# Patient Record
Sex: MALE | Race: WHITE | Employment: FULL TIME | URBAN - METROPOLITAN AREA
[De-identification: names, ages, dates, MRNs, and addresses within clinical notes are randomized per-mention and may not be internally consistent; named-entity substitution may affect disease eponyms.]

---

## 2019-03-19 PROBLEM — M75.41 IMPINGEMENT SYNDROME OF SHOULDER, RIGHT: Status: ACTIVE | Noted: 2019-03-19

## 2019-03-19 PROBLEM — M77.11 LATERAL EPICONDYLITIS, RIGHT ELBOW: Status: ACTIVE | Noted: 2019-03-19

## 2020-02-10 PROBLEM — M75.101 ROTATOR CUFF SYNDROME OF BOTH SHOULDERS: Status: ACTIVE | Noted: 2020-02-10

## 2020-02-10 PROBLEM — M75.102 ROTATOR CUFF SYNDROME OF BOTH SHOULDERS: Status: ACTIVE | Noted: 2020-02-10

## 2023-02-13 ENCOUNTER — OFFICE VISIT (OUTPATIENT)
Facility: LOCATION | Age: 58
End: 2023-02-13
Payer: COMMERCIAL

## 2023-02-13 DIAGNOSIS — K40.20 BILATERAL INGUINAL HERNIA WITHOUT OBSTRUCTION OR GANGRENE, RECURRENCE NOT SPECIFIED: Primary | ICD-10-CM

## 2023-02-13 NOTE — PATIENT INSTRUCTIONS
This patient presents in consultation from the primary care service regarding bilateral inguinal hernias. He was noted on recent examination to have bilateral hernias by Dr. Bashir carias. He states that he is having increasing pain in the right groin during exercise. He has a lump that can be pushed back in. He is aware that there is a small hernia on the left side. That one is asymptomatic. He states that he has not had any prior abdominal operations. He has no history of prior hernia repair. There is no specific event to which she can ascribe his hernia or symptoms. He has no trouble urinating. He has no constipation. He has no abdominal pain or distention. He has no nausea or vomiting. He has no developmental abnormalities of the genitalia. He has never had prostate cancer, radiation, or prostate surgery. He is not on any blood thinners. He has never had heart attack, stroke, heart murmur, heart valve replacement. The patient states he is being worked up for a cardiac arrhythmia. He states that he gets an extra beat every so often. Pulse exam: This patient has frequent PVCs. Otherwise a regular heart rate. Abdominal exam reveals him to have bilateral inguinal hernias right greater than left. The larger hernia on the right is 4 cm with the sac easily reducible. The one on the left is 2 cm sac also easily reducible. Both testes are present and descended bilaterally. There is no evidence of varicocele. There are no extra cord structures. Penis is normal and circumcised. Liver is normal, spleen is not palpable. There is no evidence of umbilical hernia. This patient will undergo bilateral laparoscopic inguinal herniorrhaphy with mesh. I had a long conversation with the patient regarding the timing of the operation. He should complete his cardiac work-up. Despite this, we should put it on the schedule as we are already scheduling 6 to 8 weeks in advance.     I described the procedure, possible outcomes, possibility of recurrence, and restrictions on activity after surgery. All risks, benefits, complications and alternatives to the proposed procedure(s) were fully discussed with the patient. All questions from the patient were answered in detail. The patient seemed to understand the conversation and its details. Consent for the procedure(s) was confirmed with the patient.

## 2023-03-21 ENCOUNTER — LAB ENCOUNTER (OUTPATIENT)
Dept: LAB | Age: 58
End: 2023-03-21
Payer: COMMERCIAL

## 2023-03-21 DIAGNOSIS — Z01.818 PRE-OP TESTING: ICD-10-CM

## 2023-03-22 LAB — SARS-COV-2 RNA RESP QL NAA+PROBE: NOT DETECTED

## 2023-03-24 ENCOUNTER — ANESTHESIA (OUTPATIENT)
Dept: SURGERY | Facility: HOSPITAL | Age: 58
End: 2023-03-24
Payer: COMMERCIAL

## 2023-03-24 ENCOUNTER — HOSPITAL ENCOUNTER (OUTPATIENT)
Facility: HOSPITAL | Age: 58
Setting detail: HOSPITAL OUTPATIENT SURGERY
Discharge: HOME | End: 2023-03-24
Attending: COLON & RECTAL SURGERY | Admitting: COLON & RECTAL SURGERY
Payer: COMMERCIAL

## 2023-03-24 ENCOUNTER — ANESTHESIA EVENT (OUTPATIENT)
Dept: SURGERY | Facility: HOSPITAL | Age: 58
End: 2023-03-24
Payer: COMMERCIAL

## 2023-03-24 VITALS
DIASTOLIC BLOOD PRESSURE: 72 MMHG | TEMPERATURE: 98 F | HEART RATE: 78 BPM | RESPIRATION RATE: 14 BRPM | BODY MASS INDEX: 24.61 KG/M2 | WEIGHT: 181.69 LBS | SYSTOLIC BLOOD PRESSURE: 126 MMHG | OXYGEN SATURATION: 100 % | HEIGHT: 72 IN

## 2023-03-24 DIAGNOSIS — Z01.818 PRE-OP TESTING: Primary | ICD-10-CM

## 2023-03-24 PROCEDURE — 0YUA4JZ SUPPLEMENT BILATERAL INGUINAL REGION WITH SYNTHETIC SUBSTITUTE, PERCUTANEOUS ENDOSCOPIC APPROACH: ICD-10-PCS | Performed by: COLON & RECTAL SURGERY

## 2023-03-24 DEVICE — BARD MESH
Type: IMPLANTABLE DEVICE | Site: GROIN | Status: FUNCTIONAL
Brand: BARD MESH

## 2023-03-24 RX ORDER — BUPIVACAINE HYDROCHLORIDE AND EPINEPHRINE 5; 5 MG/ML; UG/ML
INJECTION, SOLUTION EPIDURAL; INTRACAUDAL; PERINEURAL AS NEEDED
Status: DISCONTINUED | OUTPATIENT
Start: 2023-03-24 | End: 2023-03-24 | Stop reason: HOSPADM

## 2023-03-24 RX ORDER — NALOXONE HYDROCHLORIDE 0.4 MG/ML
80 INJECTION, SOLUTION INTRAMUSCULAR; INTRAVENOUS; SUBCUTANEOUS AS NEEDED
Status: DISCONTINUED | OUTPATIENT
Start: 2023-03-24 | End: 2023-03-24

## 2023-03-24 RX ORDER — HYDROMORPHONE HYDROCHLORIDE 1 MG/ML
0.4 INJECTION, SOLUTION INTRAMUSCULAR; INTRAVENOUS; SUBCUTANEOUS EVERY 5 MIN PRN
Status: DISCONTINUED | OUTPATIENT
Start: 2023-03-24 | End: 2023-03-24

## 2023-03-24 RX ORDER — SCOLOPAMINE TRANSDERMAL SYSTEM 1 MG/1
1 PATCH, EXTENDED RELEASE TRANSDERMAL ONCE
Status: DISCONTINUED | OUTPATIENT
Start: 2023-03-24 | End: 2023-03-24 | Stop reason: HOSPADM

## 2023-03-24 RX ORDER — PROCHLORPERAZINE EDISYLATE 5 MG/ML
5 INJECTION INTRAMUSCULAR; INTRAVENOUS EVERY 8 HOURS PRN
Status: DISCONTINUED | OUTPATIENT
Start: 2023-03-24 | End: 2023-03-24

## 2023-03-24 RX ORDER — NEOSTIGMINE METHYLSULFATE 1 MG/ML
INJECTION, SOLUTION INTRAVENOUS AS NEEDED
Status: DISCONTINUED | OUTPATIENT
Start: 2023-03-24 | End: 2023-03-24 | Stop reason: SURG

## 2023-03-24 RX ORDER — ONDANSETRON 2 MG/ML
4 INJECTION INTRAMUSCULAR; INTRAVENOUS EVERY 6 HOURS PRN
Status: DISCONTINUED | OUTPATIENT
Start: 2023-03-24 | End: 2023-03-24

## 2023-03-24 RX ORDER — ONDANSETRON 2 MG/ML
INJECTION INTRAMUSCULAR; INTRAVENOUS AS NEEDED
Status: DISCONTINUED | OUTPATIENT
Start: 2023-03-24 | End: 2023-03-24 | Stop reason: SURG

## 2023-03-24 RX ORDER — HYDROCODONE BITARTRATE AND ACETAMINOPHEN 5; 325 MG/1; MG/1
1 TABLET ORAL ONCE AS NEEDED
Status: COMPLETED | OUTPATIENT
Start: 2023-03-24 | End: 2023-03-24

## 2023-03-24 RX ORDER — SODIUM CHLORIDE, SODIUM LACTATE, POTASSIUM CHLORIDE, CALCIUM CHLORIDE 600; 310; 30; 20 MG/100ML; MG/100ML; MG/100ML; MG/100ML
INJECTION, SOLUTION INTRAVENOUS CONTINUOUS
Status: DISCONTINUED | OUTPATIENT
Start: 2023-03-24 | End: 2023-03-24

## 2023-03-24 RX ORDER — HYDROMORPHONE HYDROCHLORIDE 1 MG/ML
0.6 INJECTION, SOLUTION INTRAMUSCULAR; INTRAVENOUS; SUBCUTANEOUS EVERY 5 MIN PRN
Status: DISCONTINUED | OUTPATIENT
Start: 2023-03-24 | End: 2023-03-24

## 2023-03-24 RX ORDER — CEFAZOLIN SODIUM/WATER 2 G/20 ML
2 SYRINGE (ML) INTRAVENOUS ONCE
Status: COMPLETED | OUTPATIENT
Start: 2023-03-24 | End: 2023-03-24

## 2023-03-24 RX ORDER — LIDOCAINE HYDROCHLORIDE 10 MG/ML
INJECTION, SOLUTION EPIDURAL; INFILTRATION; INTRACAUDAL; PERINEURAL AS NEEDED
Status: DISCONTINUED | OUTPATIENT
Start: 2023-03-24 | End: 2023-03-24 | Stop reason: SURG

## 2023-03-24 RX ORDER — HYDROMORPHONE HYDROCHLORIDE 1 MG/ML
0.2 INJECTION, SOLUTION INTRAMUSCULAR; INTRAVENOUS; SUBCUTANEOUS EVERY 5 MIN PRN
Status: DISCONTINUED | OUTPATIENT
Start: 2023-03-24 | End: 2023-03-24

## 2023-03-24 RX ORDER — MIDAZOLAM HYDROCHLORIDE 1 MG/ML
1 INJECTION INTRAMUSCULAR; INTRAVENOUS EVERY 5 MIN PRN
Status: DISCONTINUED | OUTPATIENT
Start: 2023-03-24 | End: 2023-03-24

## 2023-03-24 RX ORDER — ACETAMINOPHEN 500 MG
1000 TABLET ORAL ONCE
Status: DISCONTINUED | OUTPATIENT
Start: 2023-03-24 | End: 2023-03-24 | Stop reason: HOSPADM

## 2023-03-24 RX ORDER — HEPARIN SODIUM 5000 [USP'U]/ML
5000 INJECTION, SOLUTION INTRAVENOUS; SUBCUTANEOUS ONCE
Status: COMPLETED | OUTPATIENT
Start: 2023-03-24 | End: 2023-03-24

## 2023-03-24 RX ORDER — MEPERIDINE HYDROCHLORIDE 25 MG/ML
12.5 INJECTION INTRAMUSCULAR; INTRAVENOUS; SUBCUTANEOUS AS NEEDED
Status: DISCONTINUED | OUTPATIENT
Start: 2023-03-24 | End: 2023-03-24

## 2023-03-24 RX ORDER — KETOROLAC TROMETHAMINE 30 MG/ML
INJECTION, SOLUTION INTRAMUSCULAR; INTRAVENOUS AS NEEDED
Status: DISCONTINUED | OUTPATIENT
Start: 2023-03-24 | End: 2023-03-24 | Stop reason: SURG

## 2023-03-24 RX ORDER — HYDROCODONE BITARTRATE AND ACETAMINOPHEN 5; 325 MG/1; MG/1
1 TABLET ORAL EVERY 6 HOURS PRN
Qty: 12 TABLET | Refills: 0 | Status: SHIPPED | OUTPATIENT
Start: 2023-03-24

## 2023-03-24 RX ORDER — HYDROCODONE BITARTRATE AND ACETAMINOPHEN 5; 325 MG/1; MG/1
2 TABLET ORAL ONCE AS NEEDED
Status: COMPLETED | OUTPATIENT
Start: 2023-03-24 | End: 2023-03-24

## 2023-03-24 RX ORDER — DIPHENHYDRAMINE HYDROCHLORIDE 50 MG/ML
12.5 INJECTION INTRAMUSCULAR; INTRAVENOUS AS NEEDED
Status: DISCONTINUED | OUTPATIENT
Start: 2023-03-24 | End: 2023-03-24

## 2023-03-24 RX ORDER — ACETAMINOPHEN 500 MG
1000 TABLET ORAL ONCE AS NEEDED
Status: COMPLETED | OUTPATIENT
Start: 2023-03-24 | End: 2023-03-24

## 2023-03-24 RX ORDER — ROCURONIUM BROMIDE 10 MG/ML
INJECTION, SOLUTION INTRAVENOUS AS NEEDED
Status: DISCONTINUED | OUTPATIENT
Start: 2023-03-24 | End: 2023-03-24 | Stop reason: SURG

## 2023-03-24 RX ORDER — GLYCOPYRROLATE 0.2 MG/ML
INJECTION, SOLUTION INTRAMUSCULAR; INTRAVENOUS AS NEEDED
Status: DISCONTINUED | OUTPATIENT
Start: 2023-03-24 | End: 2023-03-24 | Stop reason: SURG

## 2023-03-24 RX ORDER — DEXAMETHASONE SODIUM PHOSPHATE 4 MG/ML
VIAL (ML) INJECTION AS NEEDED
Status: DISCONTINUED | OUTPATIENT
Start: 2023-03-24 | End: 2023-03-24 | Stop reason: SURG

## 2023-03-24 RX ADMIN — ONDANSETRON 4 MG: 2 INJECTION INTRAMUSCULAR; INTRAVENOUS at 10:23:00

## 2023-03-24 RX ADMIN — KETOROLAC TROMETHAMINE 30 MG: 30 INJECTION, SOLUTION INTRAMUSCULAR; INTRAVENOUS at 11:25:00

## 2023-03-24 RX ADMIN — SODIUM CHLORIDE, SODIUM LACTATE, POTASSIUM CHLORIDE, CALCIUM CHLORIDE: 600; 310; 30; 20 INJECTION, SOLUTION INTRAVENOUS at 10:12:00

## 2023-03-24 RX ADMIN — GLYCOPYRROLATE 0.4 MG: 0.2 INJECTION, SOLUTION INTRAMUSCULAR; INTRAVENOUS at 11:21:00

## 2023-03-24 RX ADMIN — DEXAMETHASONE SODIUM PHOSPHATE 8 MG: 4 MG/ML VIAL (ML) INJECTION at 10:23:00

## 2023-03-24 RX ADMIN — SODIUM CHLORIDE, SODIUM LACTATE, POTASSIUM CHLORIDE, CALCIUM CHLORIDE: 600; 310; 30; 20 INJECTION, SOLUTION INTRAVENOUS at 11:06:00

## 2023-03-24 RX ADMIN — CEFAZOLIN SODIUM/WATER 2 G: 2 G/20 ML SYRINGE (ML) INTRAVENOUS at 10:23:00

## 2023-03-24 RX ADMIN — ROCURONIUM BROMIDE 40 MG: 10 INJECTION, SOLUTION INTRAVENOUS at 10:17:00

## 2023-03-24 RX ADMIN — LIDOCAINE HYDROCHLORIDE 70 MG: 10 INJECTION, SOLUTION EPIDURAL; INFILTRATION; INTRACAUDAL; PERINEURAL at 10:17:00

## 2023-03-24 RX ADMIN — NEOSTIGMINE METHYLSULFATE 2 MG: 1 INJECTION, SOLUTION INTRAVENOUS at 11:21:00

## 2023-03-24 NOTE — OPERATIVE REPORT
[facility]  Operative Note    Mulugeta Simon Location: OR   CSN [id number] MRN [id number]   Admission Date 3/24/2023 Operation Date 3/24/2023   Attending Physician Majo Wray MD Operating Physician Gautam Ferris MD     Pre-Operative Diagnosis: Bilateral inguinal hernia without obstruction or gangrene, recurrence not specified [K40.20]    Post-Operative Diagnosis: Same as above    Procedure Performed:  Bilateral Laparoscopic Inguinal Hernia Repair with Mesh    Surgeon(s) and Role:     Denver Mojica MD - Primary  Assistant: Genna Blancas PA-C  The assistance of [facility], was absolutely essential to the proper conduct of this case and further assisted with exact proper positioning of the mesh during tac application and complex dissection within the groin structures and anatomy. Anesthesia: General    History of Present Illness: This patient has bilateral inguinal hernias    Operative Findings: This patient was found to have a right direct inguinal hernia; and a left direct inguinal hernia  Diagnostic laparoscopy revealed no significant other findings  Liver within normal limits  No significant adhesions  The patient was delivered to recovery room in stable condition    Description of Procedure: Following adequate general anesthesia, the patient was placed in the supine position on the operating room table. The abdomen was scrubbed with Chlorhexidine. Sterile drapes were placed with wide exposure of the abdomen from xiphoid to pubis. The umbilicus was inverted. A supraumbilical incision was made. A Veress needle was inserted into the abdominal cavity and allowed to insufflate with CO2. An 11-mm trocar was placed via this incision and used for diagnostic laparoscopy. The laparoscopic findings are noted above. Two other trocars were placed in a symmetric fashion lateral to the rectus sheath near the level of the umbilicus. Each hernia was approached in an identical fashion. We began each procedure with a transverse incision through the peritoneum at the level of the pelvic floor, above the hernia defect, and above the pelvic floor structures. The peritoneum was then dissected back, inverting the hernia sac into the abdominal cavity. We then performed careful dissection identifying the pubic tubercle, Juvencio's ligament, the iliopubic tract, and the posterior rectus fascia. The Marlex mesh grafts were then fashioned to fit the hernia defect. They were secured medially to the pubic tubercle, posteriorly to Juvencio's ligament, laterally above the iliopubic tract, and anteriorly to the posterior rectus fascia. Once this was accomplished, the peritoneum was then closed over the mesh repair. All of this was accomplished using the hernia tacker device. We then began with closure of the abdomen. All laparoscopic instruments were removed from the patient and accounted for on a side table. The CO2 was suctioned from the abdominal cavity. The umbilical fascial defect was closed with 0 Maxon. The skin ports were all closed with 5-0 undyed Vicryl in a subcuticular fashion, and 0.5% Marcaine with epinephrine was injected into the wound margins. Sterile dressings were placed, and the patient was transported to recovery in stable postoperative condition.       Complications: None    EBL: 5 cc    Pathologic specimens:  none    Jason Fagan MD  3/24/2023  11:43 AM

## 2023-03-24 NOTE — ANESTHESIA PROCEDURE NOTES
Airway  Date/Time: 3/24/2023 10:19 AM  Urgency: elective      General Information and Staff    Patient location during procedure: OR  Anesthesiologist: Kellie Chapin MD  Performed: anesthesiologist   Performed by: Kellie Chapin MD  Authorized by: Kellie Chapin MD      Indications and Patient Condition  Indications for airway management: anesthesia  Sedation level: deep  Preoxygenated: yes  Patient position: sniffing  Mask difficulty assessment: 1 - vent by mask    Final Airway Details  Final airway type: endotracheal airway      Successful airway: ETT  Cuffed: yes   Successful intubation technique: direct laryngoscopy  Endotracheal tube insertion site: oral  Blade: Lopez  Blade size: #2  ETT size (mm): 7.0    Cormack-Lehane Classification: grade I - full view of glottis  Placement verified by: chest auscultation and capnometry   Measured from: lips  ETT to lips (cm): 23  Number of attempts at approach: 1

## 2023-03-30 ENCOUNTER — OFFICE VISIT (OUTPATIENT)
Facility: LOCATION | Age: 58
End: 2023-03-30

## 2023-03-30 DIAGNOSIS — K40.20 BILATERAL INGUINAL HERNIA WITHOUT OBSTRUCTION OR GANGRENE, RECURRENCE NOT SPECIFIED: Primary | ICD-10-CM

## 2023-03-30 PROCEDURE — 99024 POSTOP FOLLOW-UP VISIT: CPT | Performed by: COLON & RECTAL SURGERY

## (undated) DEVICE — TUBING MEGADYNE LAPAROSCOPIC

## (undated) DEVICE — LIGHT HANDLE

## (undated) DEVICE — STERILE POLYISOPRENE POWDER-FREE SURGICAL GLOVES: Brand: PROTEXIS

## (undated) DEVICE — SUT VICRYL 5-0 PC-1 J834G

## (undated) DEVICE — APPLICATOR CHLORAPREP 26ML

## (undated) DEVICE — MONOFILAMENT ABSORBABLE SUTURE: Brand: MAXON

## (undated) DEVICE — TRAY SURESTEP 16 BARDEX UMETR

## (undated) DEVICE — SOL NACL IRRIG 0.9% 1000ML BTL

## (undated) DEVICE — 40580 - THE PINK PAD - ADVANCED TRENDELENBURG POSITIONING KIT: Brand: 40580 - THE PINK PAD - ADVANCED TRENDELENBURG POSITIONING KIT

## (undated) DEVICE — TROCAR: Brand: KII SHIELDED BLADED ACCESS SYSTEM

## (undated) DEVICE — ENDOCUT SCISSOR TIP, DISPOSABLE: Brand: RENEW

## (undated) DEVICE — FENESTRATED GRASPER TIP, DISPOSABLE: Brand: RENEW

## (undated) DEVICE — ENDOPATH ULTRA VERESS INSUFFLATION NEEDLES WITH LUER LOCK CONNECTORS: Brand: ENDOPATH

## (undated) DEVICE — GENERAL LAPAROS CDS-LF: Brand: MEDLINE INDUSTRIES, INC.

## (undated) DEVICE — TROCAR: Brand: KII SLEEVE

## (undated) DEVICE — VIOLET BRAIDED (POLYGLACTIN 910), SYNTHETIC ABSORBABLE SUTURE: Brand: COATED VICRYL

## (undated) DEVICE — SLEEVE KENDALL SCD EXPRESS MED

## (undated) DEVICE — CAPSURE PERMANENT FIXATION SYSTEM 30 PERMANENT FASTENERS: Brand: CAPSURE PERMANENT FIXATION SYSTEM

## (undated) DEVICE — SPONGE STICK WITH PVP-I: Brand: KENDALL

## (undated) NOTE — LETTER
23    Patient: Allan Casper  : 1965 Visit date: 3/30/2023    Dear  Jack Gordon MD    Thank you for referring Allan Casper to my practice. Please find my assessment and plan below. Assessment   Postoperative state  Bilateral inguinal hernias      Plan   The patient presents for continued care and evaluation following a laparoscopic bilateral inguinal herniorrhaphy on 2023. The patient states his pain has been gradually improving since the time of his operation. He only required Norco the day of his operation. He is concerned, because occasionally with certain movements he has sharp instances of pain. He states the pain only last several seconds. Overall, his pain is very tolerable with Advil. The patient is tolerating a general diet. He denies nausea or vomiting. He is having regular bowel movements. The patient states his incision at the umbilicus drained the day following his operation, but he has had no drainage or bleeding from his incision site since. The patient does not require a return to work note. Clinical exam of the patient's abdomen reveals it to soft, nontender, nondistended. His laparoscopic incision sites are covered with Steri-Strips. They are clean, dry, intact without surrounding erythema or cellulitis. His testes are equal in size and descended bilaterally. There is no edema or ecchymosis. Penis is normal and circumcised. The patient is doing well status post bilateral inguinal herniorrhaphy with mesh on 2023. I discussed with the patient that they should refrain from any bending, pushing, pulling, twisting, or lifting of a force greater than 20 pounds for 8 weeks post-op. The patient should avoid submerging their incisions in a bath, hot tub, pool for a total of 2 weeks postoperatively.     I explained to the patient that his pain is normal.  He may have short bursts of pain similar to this as the mesh is incorporating. He was counseled and when to return to activity and driving. The patient may take ibuprofen and Tylenol as needed for pain management. All of the patient's questions were answered. The patient verbalized understanding and agreement with the plan of care. I have no further follow-up scheduled with this patient at this time. This patient can see me on an as-needed basis. This patient should return urgently for any problems or complications related to the surgical intervention.            Sincerely,       Robel Levi MD   CC: No Recipients

## (undated) NOTE — LETTER
23    Patient: Citlaly Peterson  : 1965 Visit date: 2023    Dear  Jayjay Hernandez MD    Thank you for referring Citlaly Peterson to my practice. Please find my assessment and plan below. Assessment   Bilateral inguinal hernia without obstruction or gangrene, recurrence not specified  (primary encounter diagnosis)      Plan   This patient presents in consultation from the primary care service regarding bilateral inguinal hernias. He was noted on recent examination to have bilateral hernias by Dr. Suman carias. He states that he is having increasing pain in the right groin during exercise. He has a lump that can be pushed back in. He is aware that there is a small hernia on the left side. That one is asymptomatic. He states that he has not had any prior abdominal operations. He has no history of prior hernia repair. There is no specific event to which she can ascribe his hernia or symptoms. He has no trouble urinating. He has no constipation. He has no abdominal pain or distention. He has no nausea or vomiting. He has no developmental abnormalities of the genitalia. He has never had prostate cancer, radiation, or prostate surgery. He is not on any blood thinners. He has never had heart attack, stroke, heart murmur, heart valve replacement. The patient states he is being worked up for a cardiac arrhythmia. He states that he gets an extra beat every so often. Pulse exam: This patient has frequent PVCs. Otherwise a regular heart rate. Abdominal exam reveals him to have bilateral inguinal hernias right greater than left. The larger hernia on the right is 4 cm with the sac easily reducible. The one on the left is 2 cm sac also easily reducible. Both testes are present and descended bilaterally. There is no evidence of varicocele. There are no extra cord structures. Penis is normal and circumcised. Liver is normal, spleen is not palpable.   There is no evidence of umbilical hernia. This patient will undergo bilateral laparoscopic inguinal herniorrhaphy with mesh. I had a long conversation with the patient regarding the timing of the operation. He should complete his cardiac work-up. Despite this, we should put it on the schedule as we are already scheduling 6 to 8 weeks in advance. I described the procedure, possible outcomes, possibility of recurrence, and restrictions on activity after surgery.           Sincerely,       Wang Muñoz MD   CC: No Recipients